# Patient Record
Sex: MALE | URBAN - METROPOLITAN AREA
[De-identification: names, ages, dates, MRNs, and addresses within clinical notes are randomized per-mention and may not be internally consistent; named-entity substitution may affect disease eponyms.]

---

## 2017-01-11 ENCOUNTER — EMERGENCY (EMERGENCY)
Facility: HOSPITAL | Age: 37
LOS: 1 days | Discharge: LEFT BEFORE DISCHARGE | End: 2017-01-11
Attending: EMERGENCY MEDICINE
Payer: SELF-PAY

## 2017-01-11 VITALS
WEIGHT: 160.06 LBS | DIASTOLIC BLOOD PRESSURE: 80 MMHG | OXYGEN SATURATION: 98 % | SYSTOLIC BLOOD PRESSURE: 120 MMHG | RESPIRATION RATE: 18 BRPM | TEMPERATURE: 98 F | HEART RATE: 100 BPM | HEIGHT: 67 IN

## 2017-01-11 DIAGNOSIS — L02.414 CUTANEOUS ABSCESS OF LEFT UPPER LIMB: ICD-10-CM

## 2017-01-11 PROCEDURE — 99281 EMR DPT VST MAYX REQ PHY/QHP: CPT

## 2017-01-11 RX ORDER — IBUPROFEN 200 MG
600 TABLET ORAL ONCE
Qty: 0 | Refills: 0 | Status: DISCONTINUED | OUTPATIENT
Start: 2017-01-11 | End: 2017-01-15

## 2017-01-11 NOTE — ED PROVIDER NOTE - NS ED MD SCRIBE ATTENDING SCRIBE SECTIONS
REVIEW OF SYSTEMS/HISTORY OF PRESENT ILLNESS/PAST MEDICAL/SURGICAL/SOCIAL HISTORY/VITAL SIGNS( Pullset)/DISPOSITION/PHYSICAL EXAM

## 2017-05-19 ENCOUNTER — EMERGENCY (EMERGENCY)
Facility: HOSPITAL | Age: 37
LOS: 1 days | Discharge: HOME | End: 2017-05-19
Attending: EMERGENCY MEDICINE
Payer: MEDICAID

## 2017-05-19 VITALS
TEMPERATURE: 98 F | HEIGHT: 67 IN | RESPIRATION RATE: 16 BRPM | OXYGEN SATURATION: 99 % | WEIGHT: 154.98 LBS | HEART RATE: 78 BPM | SYSTOLIC BLOOD PRESSURE: 120 MMHG | DIASTOLIC BLOOD PRESSURE: 70 MMHG

## 2017-05-19 PROCEDURE — 12002 RPR S/N/AX/GEN/TRNK2.6-7.5CM: CPT

## 2017-05-19 PROCEDURE — 99284 EMERGENCY DEPT VISIT MOD MDM: CPT | Mod: 25

## 2017-05-19 PROCEDURE — 99283 EMERGENCY DEPT VISIT LOW MDM: CPT | Mod: 25

## 2017-05-19 PROCEDURE — 90715 TDAP VACCINE 7 YRS/> IM: CPT

## 2017-05-19 PROCEDURE — 90471 IMMUNIZATION ADMIN: CPT

## 2017-05-19 RX ORDER — TETANUS TOXOID, REDUCED DIPHTHERIA TOXOID AND ACELLULAR PERTUSSIS VACCINE, ADSORBED 5; 2.5; 8; 8; 2.5 [IU]/.5ML; [IU]/.5ML; UG/.5ML; UG/.5ML; UG/.5ML
0.5 SUSPENSION INTRAMUSCULAR ONCE
Qty: 0 | Refills: 0 | Status: COMPLETED | OUTPATIENT
Start: 2017-05-19 | End: 2017-05-19

## 2017-05-19 RX ORDER — AZTREONAM 2 G
1 VIAL (EA) INJECTION
Qty: 10 | Refills: 0 | OUTPATIENT
Start: 2017-05-19 | End: 2017-05-24

## 2017-05-19 RX ADMIN — TETANUS TOXOID, REDUCED DIPHTHERIA TOXOID AND ACELLULAR PERTUSSIS VACCINE, ADSORBED 0.5 MILLILITER(S): 5; 2.5; 8; 8; 2.5 SUSPENSION INTRAMUSCULAR at 19:38

## 2017-05-19 NOTE — ED PROVIDER NOTE - NS ED MD SCRIBE ATTENDING SCRIBE SECTIONS
PHYSICAL EXAM/PAST MEDICAL/SURGICAL/SOCIAL HISTORY/REVIEW OF SYSTEMS/DISPOSITION/VITAL SIGNS( Pullset)/HIV/HISTORY OF PRESENT ILLNESS

## 2017-05-19 NOTE — ED ADULT NURSE NOTE - OBJECTIVE STATEMENT
Patient complains of pain to left hand thumb after cutting it with utility knife. Active bleeding to left thumb, wrapped in gauze at this time. + left radial pulse, able to move all fingers of left hand. Patient states numbness to left thumb. MD Rivera at bedside to suture left thumb.

## 2017-05-19 NOTE — ED PROVIDER NOTE - OBJECTIVE STATEMENT
Co-worker as . 37 y/o M pt with no PMHx and no PSHx presents to ED c/o L thumb laceration with associated pain and active bleeding s/p cutting thumb with a utility knife at work today. Pt denies fever, chills, abd pain, CP, difficulty breathing, or any other complaints. NKDA.

## 2017-05-23 DIAGNOSIS — S61.012A LACERATION WITHOUT FOREIGN BODY OF LEFT THUMB WITHOUT DAMAGE TO NAIL, INITIAL ENCOUNTER: ICD-10-CM

## 2017-05-23 DIAGNOSIS — Z23 ENCOUNTER FOR IMMUNIZATION: ICD-10-CM
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
## 2017-05-30 ENCOUNTER — EMERGENCY (EMERGENCY)
Facility: HOSPITAL | Age: 37
LOS: 1 days | Discharge: HOME | End: 2017-05-30
Attending: EMERGENCY MEDICINE
Payer: MEDICAID

## 2017-05-30 VITALS
WEIGHT: 154.98 LBS | HEART RATE: 80 BPM | SYSTOLIC BLOOD PRESSURE: 117 MMHG | TEMPERATURE: 99 F | RESPIRATION RATE: 18 BRPM | HEIGHT: 67 IN | DIASTOLIC BLOOD PRESSURE: 71 MMHG | OXYGEN SATURATION: 99 %

## 2017-05-30 DIAGNOSIS — L03.012 CELLULITIS OF LEFT FINGER: ICD-10-CM

## 2017-05-30 DIAGNOSIS — Z48.02 ENCOUNTER FOR REMOVAL OF SUTURES: ICD-10-CM

## 2017-05-30 PROCEDURE — 29130 APPL FINGER SPLINT STATIC: CPT | Mod: FA

## 2017-05-30 PROCEDURE — 99284 EMERGENCY DEPT VISIT MOD MDM: CPT | Mod: 25

## 2017-05-30 PROCEDURE — 29130 APPL FINGER SPLINT STATIC: CPT

## 2017-05-30 PROCEDURE — 99282 EMERGENCY DEPT VISIT SF MDM: CPT | Mod: 25

## 2017-05-30 NOTE — ED PROVIDER NOTE - PROGRESS NOTE DETAILS
Patient says that one suture came out, I removed one suture, so six remain, looks like wound infection and sutures are not ready to be removed yet. I placed bacitracin, sterile dressing and splint. Will give abx and have return in 4 days for suture removal. Precautions reviewed.

## 2017-05-30 NOTE — ED PROVIDER NOTE - SKIN, MLM
Skin normal color for race, warm, dry and intact. No evidence of rash. there is a healing wound over the left proximal thumb, 7 sutures are present and there is a 5mm area of surrounding erythema, no fluctuance, no drainage, no fb palptated, normal ROM of the thumb and sensation intact.

## 2017-05-30 NOTE — ED PROCEDURE NOTE - NS ED PERI VASCULAR NEG
no paresthesia/no cyanosis of extremity/no swelling/capillary refill time < 2 seconds/fingers/toes warm to touch

## 2017-05-30 NOTE — ED ADULT NURSE NOTE - OBJECTIVE STATEMENT
AOX3 +ambulatory patient reports here for suture removal. Patient noted with redness around the area no fevers or chills

## 2017-05-30 NOTE — ED PROVIDER NOTE - MEDICAL DECISION MAKING DETAILS
Sutures not ready to be removed, I removed one. Splinted, sterile dressing and topical abx. Will return in 4 days for removal.

## 2017-06-05 ENCOUNTER — EMERGENCY (EMERGENCY)
Facility: HOSPITAL | Age: 37
LOS: 1 days | Discharge: HOME | End: 2017-06-05
Attending: EMERGENCY MEDICINE
Payer: MEDICAID

## 2017-06-05 VITALS
HEART RATE: 89 BPM | OXYGEN SATURATION: 99 % | DIASTOLIC BLOOD PRESSURE: 69 MMHG | TEMPERATURE: 99 F | WEIGHT: 154.98 LBS | SYSTOLIC BLOOD PRESSURE: 113 MMHG | RESPIRATION RATE: 18 BRPM | HEIGHT: 67 IN

## 2017-06-05 DIAGNOSIS — Z48.02 ENCOUNTER FOR REMOVAL OF SUTURES: ICD-10-CM

## 2017-06-05 PROCEDURE — 99281 EMR DPT VST MAYX REQ PHY/QHP: CPT

## 2017-06-05 NOTE — ED ADULT NURSE NOTE - OBJECTIVE STATEMENT
STATES HE CAME IN FOR SUTURE REMOVAL FROM LEFT  THUMB.LEFT THUMB SUTURES INTACT , NO REDNESS , SWELLING OR DISCHARGE NOTED. STATES HE CAME IN FOR SUTURE REMOVAL FROM LEFT  THUMB.LEFT THUMB SUTURES INTACT , NO REDNESS , SWELLING OR DISCHARGE NOTED.SUTRES REMOVED BY DR. ADAN.

## 2017-06-26 NOTE — ED PROVIDER NOTE - OBJECTIVE STATEMENT
36 y/ M pt with no significant PMHx and no significant PSHx presents to ED for suture removal s/p hand sutures placed x14 days ago. Pt was seen x6 days ago and dr did not remove all sutures secondary to signs of infection. Pt notes that he finished his abx. Wound appears clean. Pt currently denies pain, fever, chills or any other complaints.

## 2017-06-26 NOTE — ED PROVIDER NOTE - MEDICAL DECISION MAKING DETAILS
35 y/o M pt presents to ED for suture removal s/p sutures on L hand x14 days ago. Wound appears clean and healed. Removed sutures and D/C home.

## 2017-09-05 ENCOUNTER — EMERGENCY (EMERGENCY)
Facility: HOSPITAL | Age: 37
LOS: 1 days | Discharge: HOME | End: 2017-09-05
Attending: EMERGENCY MEDICINE
Payer: MEDICAID

## 2017-09-05 VITALS
WEIGHT: 164.91 LBS | HEART RATE: 80 BPM | DIASTOLIC BLOOD PRESSURE: 74 MMHG | SYSTOLIC BLOOD PRESSURE: 124 MMHG | HEIGHT: 66 IN | OXYGEN SATURATION: 100 % | RESPIRATION RATE: 16 BRPM | TEMPERATURE: 98 F

## 2017-09-05 DIAGNOSIS — M25.561 PAIN IN RIGHT KNEE: ICD-10-CM

## 2017-09-05 PROCEDURE — 73562 X-RAY EXAM OF KNEE 3: CPT | Mod: 26,RT

## 2017-09-05 PROCEDURE — 99283 EMERGENCY DEPT VISIT LOW MDM: CPT | Mod: 25

## 2017-09-05 RX ORDER — IBUPROFEN 200 MG
600 TABLET ORAL ONCE
Qty: 0 | Refills: 0 | Status: COMPLETED | OUTPATIENT
Start: 2017-09-05 | End: 2017-09-05

## 2017-09-05 NOTE — ED PROVIDER NOTE - NEURO NEGATIVE STATEMENT, MLM
no loss of consciousness, no gait abnormality, no headache, no sensory deficits, and no weakness. No numbness, no tingling.

## 2017-09-05 NOTE — ED PROVIDER NOTE - MEDICAL DECISION MAKING DETAILS
Pt is neurovascularly intact in knee immobilizer. Rx IBU. Pt given cotnact to f/u with ortho. P able to bear weight and ambulate,  stable for discharge and follow up with medical doctor. Pt educated on care and need for follow up. Discussed anticipatory guidance and return precautions. Questions answered. I had a detailed discussion with the patient and/or guardian regarding the historical points, exam findings, and any diagnostic results supporting the discharge diagnosis.

## 2017-09-05 NOTE — ED PROVIDER NOTE - CONDUCTED A DETAILED DISCUSSION WITH PATIENT AND/OR GUARDIAN REGARDING, MDM
radiology results/need for outpatient follow-up return to ED if symptoms worsen, persist or questions arise/need for outpatient follow-up/radiology results

## 2017-09-05 NOTE — ED PROVIDER NOTE - OBJECTIVE STATEMENT
35 y/o M pt with no PMHx and no PSHx presents to ED c/o R knee pain x1 week. Pt denies numbness, tingling, or any other complaints. Pt also denies recent trauma to R knee. NKDA.

## 2017-09-05 NOTE — ED PROVIDER NOTE - PHYSICAL EXAMINATION
MUSCULOSKELETAL: R knee: Anterior, posterior drawer test and Lachman/Vulgus/Varus test are negative. No bony deformities, +knee flexion and extension intact, cap refill < 2 secs, pedal pulses intact. MUSCULOSKELETAL: R knee: Anterior, posterior drawer test, /Vulgus/Varus test are negative. No bony deformities, +knee flexion and extension intact, cap refill < 2 secs, pedal pulses intact.

## 2017-09-06 PROCEDURE — 73562 X-RAY EXAM OF KNEE 3: CPT

## 2017-09-06 PROCEDURE — 99284 EMERGENCY DEPT VISIT MOD MDM: CPT | Mod: 25

## 2017-09-06 RX ORDER — IBUPROFEN 200 MG
1 TABLET ORAL
Qty: 20 | Refills: 0 | OUTPATIENT
Start: 2017-09-06

## 2017-09-06 RX ADMIN — Medication 600 MILLIGRAM(S): at 00:18

## 2017-09-06 NOTE — ED PROCEDURE NOTE - CPROC ED POST PROC CARE GUIDE1
Elevate the injured extremity as instructed./Keep the cast/splint/dressing clean and dry./Verbal/written post procedure instructions were given to patient/caregiver./Instructed patient/caregiver regarding signs and symptoms of infection./Instructed patient/caregiver to follow-up with primary care physician.

## 2017-10-11 ENCOUNTER — EMERGENCY (EMERGENCY)
Facility: HOSPITAL | Age: 37
LOS: 1 days | Discharge: HOME | End: 2017-10-11
Attending: EMERGENCY MEDICINE
Payer: MEDICAID

## 2017-10-11 VITALS
WEIGHT: 160.06 LBS | HEIGHT: 67 IN | DIASTOLIC BLOOD PRESSURE: 75 MMHG | SYSTOLIC BLOOD PRESSURE: 117 MMHG | HEART RATE: 106 BPM | RESPIRATION RATE: 19 BRPM | OXYGEN SATURATION: 100 % | TEMPERATURE: 98 F

## 2017-10-11 VITALS
HEART RATE: 90 BPM | OXYGEN SATURATION: 100 % | RESPIRATION RATE: 18 BRPM | TEMPERATURE: 97 F | SYSTOLIC BLOOD PRESSURE: 124 MMHG | DIASTOLIC BLOOD PRESSURE: 54 MMHG

## 2017-10-11 DIAGNOSIS — R19.7 DIARRHEA, UNSPECIFIED: ICD-10-CM

## 2017-10-11 PROCEDURE — 99283 EMERGENCY DEPT VISIT LOW MDM: CPT | Mod: 25

## 2017-10-11 PROCEDURE — 99283 EMERGENCY DEPT VISIT LOW MDM: CPT

## 2017-10-11 RX ORDER — SODIUM CHLORIDE 9 MG/ML
1000 INJECTION INTRAMUSCULAR; INTRAVENOUS; SUBCUTANEOUS ONCE
Qty: 0 | Refills: 0 | Status: DISCONTINUED | OUTPATIENT
Start: 2017-10-11 | End: 2017-10-15

## 2017-10-11 RX ORDER — KETOROLAC TROMETHAMINE 30 MG/ML
15 SYRINGE (ML) INJECTION ONCE
Qty: 0 | Refills: 0 | Status: COMPLETED | OUTPATIENT
Start: 2017-10-11 | End: 2017-10-11

## 2017-10-11 NOTE — ED PROVIDER NOTE - OBJECTIVE STATEMENT
35 y/o M pt with no significant PMHx and no significant PSHx presents to the ED c/o fever x yesterday and diarrhea x today. Pt states he has mild abd pain and also reports body aches. Pt denies hematuria, melena, cough, vomiting or any other complaints. NKDA.

## 2017-10-11 NOTE — ED PROVIDER NOTE - MEDICAL DECISION MAKING DETAILS
37 y/o M pt presents with diarrhea, abd pain and fever. Will check labs, give analgesia, IV fluids and reassess. 35 y/o M pt presents with diarrhea, abd pain. Declined w/u. Feels improved in ED. Return to the ED immediately if getting worse, not improving, or if having any new or troubling symptoms. f/u with PMD in 1-2 days.

## 2017-10-11 NOTE — ED PROVIDER NOTE - PROGRESS NOTE DETAILS
Nurse called patient multiple times to start his work-up. Patient just came up to nurse's desk and said he was in the bathroom each time his name was called. No longer wants work-up. Feels improved.

## 2019-12-05 NOTE — ED PROVIDER NOTE - NS ED ATTENDING STATEMENT MOD
-- DO NOT REPLY / DO NOT REPLY ALL --  -- Message is from the [facility]--    Patient is requesting a medication refill - medication is on active medication list    Patient is currently OUT of the requested medication.    Was Medication Pended?  Yes.    Rx Name and Dose:tadalafil (CIALIS) 10 MG tablet      Duration: 30 days    Pharmacy  [id number] - [address]    Patient confirmed the above pharmacy as correct?  Yes    Caller Information       Type Contact Phone    12/05/2019 02:20 PM Phone (Incoming) Jose Stallings (Self) [phone] (M)          Alternative phone number: NA    Turnaround time given to caller:   \"This message will be sent to [state Provider's name]. The clinical team will fulfill your request as soon as they review your message.\"  
I personally performed the services described in the documentation, reviewed the documentation recorded by the scribe in my presence and it accurately and completely records my words and action.

## 2020-02-26 ENCOUNTER — EMERGENCY (EMERGENCY)
Facility: HOSPITAL | Age: 40
LOS: 1 days | Discharge: HOME | End: 2020-02-26
Attending: EMERGENCY MEDICINE
Payer: MEDICAID

## 2020-02-26 VITALS
WEIGHT: 171.96 LBS | DIASTOLIC BLOOD PRESSURE: 84 MMHG | SYSTOLIC BLOOD PRESSURE: 138 MMHG | HEIGHT: 67 IN | HEART RATE: 85 BPM | RESPIRATION RATE: 16 BRPM | TEMPERATURE: 98 F | OXYGEN SATURATION: 97 %

## 2020-02-26 LAB
ANION GAP SERPL CALC-SCNC: 6 MMOL/L — SIGNIFICANT CHANGE UP (ref 5–17)
BASOPHILS # BLD AUTO: 0.06 K/UL — SIGNIFICANT CHANGE UP (ref 0–0.2)
BASOPHILS NFR BLD AUTO: 0.6 % — SIGNIFICANT CHANGE UP (ref 0–2)
BUN SERPL-MCNC: 19 MG/DL — HIGH (ref 7–18)
CALCIUM SERPL-MCNC: 8.7 MG/DL — SIGNIFICANT CHANGE UP (ref 8.4–10.5)
CHLORIDE SERPL-SCNC: 105 MMOL/L — SIGNIFICANT CHANGE UP (ref 96–108)
CO2 SERPL-SCNC: 28 MMOL/L — SIGNIFICANT CHANGE UP (ref 22–31)
CREAT SERPL-MCNC: 1.1 MG/DL — SIGNIFICANT CHANGE UP (ref 0.5–1.3)
EOSINOPHIL # BLD AUTO: 0.13 K/UL — SIGNIFICANT CHANGE UP (ref 0–0.5)
EOSINOPHIL NFR BLD AUTO: 1.3 % — SIGNIFICANT CHANGE UP (ref 0–6)
GLUCOSE SERPL-MCNC: 101 MG/DL — HIGH (ref 70–99)
HCT VFR BLD CALC: 40.2 % — SIGNIFICANT CHANGE UP (ref 39–50)
HGB BLD-MCNC: 13.9 G/DL — SIGNIFICANT CHANGE UP (ref 13–17)
IMM GRANULOCYTES NFR BLD AUTO: 0.3 % — SIGNIFICANT CHANGE UP (ref 0–1.5)
LYMPHOCYTES # BLD AUTO: 2.11 K/UL — SIGNIFICANT CHANGE UP (ref 1–3.3)
LYMPHOCYTES # BLD AUTO: 20.4 % — SIGNIFICANT CHANGE UP (ref 13–44)
MCHC RBC-ENTMCNC: 30.8 PG — SIGNIFICANT CHANGE UP (ref 27–34)
MCHC RBC-ENTMCNC: 34.6 GM/DL — SIGNIFICANT CHANGE UP (ref 32–36)
MCV RBC AUTO: 88.9 FL — SIGNIFICANT CHANGE UP (ref 80–100)
MONOCYTES # BLD AUTO: 0.68 K/UL — SIGNIFICANT CHANGE UP (ref 0–0.9)
MONOCYTES NFR BLD AUTO: 6.6 % — SIGNIFICANT CHANGE UP (ref 2–14)
NEUTROPHILS # BLD AUTO: 7.32 K/UL — SIGNIFICANT CHANGE UP (ref 1.8–7.4)
NEUTROPHILS NFR BLD AUTO: 70.8 % — SIGNIFICANT CHANGE UP (ref 43–77)
NRBC # BLD: 0 /100 WBCS — SIGNIFICANT CHANGE UP (ref 0–0)
PLATELET # BLD AUTO: 208 K/UL — SIGNIFICANT CHANGE UP (ref 150–400)
POTASSIUM SERPL-MCNC: 3.6 MMOL/L — SIGNIFICANT CHANGE UP (ref 3.5–5.3)
POTASSIUM SERPL-SCNC: 3.6 MMOL/L — SIGNIFICANT CHANGE UP (ref 3.5–5.3)
RBC # BLD: 4.52 M/UL — SIGNIFICANT CHANGE UP (ref 4.2–5.8)
RBC # FLD: 12.4 % — SIGNIFICANT CHANGE UP (ref 10.3–14.5)
SODIUM SERPL-SCNC: 139 MMOL/L — SIGNIFICANT CHANGE UP (ref 135–145)
WBC # BLD: 10.33 K/UL — SIGNIFICANT CHANGE UP (ref 3.8–10.5)
WBC # FLD AUTO: 10.33 K/UL — SIGNIFICANT CHANGE UP (ref 3.8–10.5)

## 2020-02-26 PROCEDURE — 73620 X-RAY EXAM OF FOOT: CPT | Mod: 26,RT

## 2020-02-26 PROCEDURE — 73610 X-RAY EXAM OF ANKLE: CPT

## 2020-02-26 PROCEDURE — 70450 CT HEAD/BRAIN W/O DYE: CPT

## 2020-02-26 PROCEDURE — 99284 EMERGENCY DEPT VISIT MOD MDM: CPT

## 2020-02-26 PROCEDURE — 70450 CT HEAD/BRAIN W/O DYE: CPT | Mod: 26

## 2020-02-26 PROCEDURE — 80048 BASIC METABOLIC PNL TOTAL CA: CPT

## 2020-02-26 PROCEDURE — 73610 X-RAY EXAM OF ANKLE: CPT | Mod: 26,RT

## 2020-02-26 PROCEDURE — 36415 COLL VENOUS BLD VENIPUNCTURE: CPT

## 2020-02-26 PROCEDURE — 73620 X-RAY EXAM OF FOOT: CPT

## 2020-02-26 PROCEDURE — 85027 COMPLETE CBC AUTOMATED: CPT

## 2020-02-26 PROCEDURE — 99284 EMERGENCY DEPT VISIT MOD MDM: CPT | Mod: 25

## 2020-02-26 NOTE — ED PROVIDER NOTE - NS ED MD EM SELECTION
Chemotherapy Verification - PRIMARY RN      Height = 156cm  Weight = 49.6kg  BSA = 1.47m2       Medication: Navelbine  Dose: 25mg/m2  Calculated Dose: 36.75mg                             (In mg/m2, AUC, mg/kg)           I confirm this process was performed independently with the BSA and all final chemotherapy dosing calculations congruent.  Any discrepancies of 5% or greater have been addressed with the chemotherapy pharmacist. The resolution of the discrepancy has been documented in the EPIC progress notes.        [address] Detailed

## 2020-02-26 NOTE — ED PROVIDER NOTE - CLINICAL SUMMARY MEDICAL DECISION MAKING FREE TEXT BOX
Patient with right ankle pain and paresthesia in the foot. Neurovascularly intact. Obtain imaging, labs, and reassess.

## 2020-02-26 NOTE — ED PROVIDER NOTE - PATIENT PORTAL LINK FT
You can access the FollowMyHealth Patient Portal offered by [facility] by registering at the following website: [email or web address]. By joining [employer]’s FollowMyHealth portal, you will also be able to view your health information using other applications (apps) compatible with our system.

## 2020-02-26 NOTE — ED ADULT NURSE NOTE - NSIMPLEMENTINTERV_GEN_ALL_ED
Implemented All Fall Risk Interventions:  [place] to call system. Call bell, personal items and telephone within reach. Instruct patient to call for assistance. Room bathroom lighting operational. Non-slip footwear when patient is off stretcher. Physically safe environment: no spills, clutter or unnecessary equipment. Stretcher in lowest position, wheels locked, appropriate side rails in place. Provide visual cue, wrist band, yellow gown, etc. Monitor gait and stability. Monitor for mental status changes and reorient to person, place, and time. Review medications for side effects contributing to fall risk. Reinforce activity limits and safety measures with patient and family.

## 2020-02-26 NOTE — ED PROVIDER NOTE - OBJECTIVE STATEMENT
40 y/o M patient w/ no significant PMHx and no significant PSHx presents to the ED with right ankle pain. Patient reports he drank alcohol heavily x8 days ago and fell and injured his right ankle. Patient says he doesn't recall what happened after the incident. Patient denies drinking alcohol everyday. Patient has pain to the right lateral ankle along with numbness to the foot and leg. Patient denies headache, neck pain, chills, nausea, vomiting, weakness, and any other complaints. NKDA.

## 2020-02-26 NOTE — ED PROVIDER NOTE - NSFOLLOWUPCLINICS_GEN_ALL_ED_FT
[facility]  Neurology  [address] [place] [address]  Phone: [phone]  Fax: [phone]  Follow Up Time:

## 2020-02-26 NOTE — ED PROVIDER NOTE - PHYSICAL EXAMINATION
Decreased sensation worse in the plantar surface of the foot but present in the entire right leg compared to left  DP/PT pulses intact 2+  Normal plantar and dorsi flexion Decreased sensation worse in the plantar surface of the foot but present in the entire right leg compared to left, normal reflexes in the lower ext.  DP/PT pulses intact 2+  Normal plantar and dorsi flexion

## 2020-03-02 ENCOUNTER — EMERGENCY (EMERGENCY)
Facility: HOSPITAL | Age: 40
LOS: 1 days | Discharge: HOME | End: 2020-03-02
Attending: EMERGENCY MEDICINE
Payer: MEDICAID

## 2020-03-02 VITALS
SYSTOLIC BLOOD PRESSURE: 139 MMHG | HEART RATE: 92 BPM | HEIGHT: 67 IN | TEMPERATURE: 98 F | WEIGHT: 171.96 LBS | DIASTOLIC BLOOD PRESSURE: 76 MMHG | RESPIRATION RATE: 20 BRPM | OXYGEN SATURATION: 98 %

## 2020-03-02 PROCEDURE — 99283 EMERGENCY DEPT VISIT LOW MDM: CPT

## 2020-03-02 RX ORDER — DIAZEPAM 5 MG
5 TABLET ORAL ONCE
Refills: 0 | Status: DISCONTINUED | OUTPATIENT
Start: 2020-03-02 | End: 2020-03-02

## 2020-03-02 RX ADMIN — Medication 0.1 MILLIGRAM(S): at 19:55

## 2020-03-02 RX ADMIN — Medication 5 MILLIGRAM(S): at 19:56

## 2020-03-02 NOTE — ED PROVIDER NOTE - PATIENT PORTAL LINK FT
You can access the FollowMyHealth Patient Portal offered by [facility] by registering at the following website: [email or web address]. By joining [employer]’s FollowMyHealth portal, you will also be able to view your health information using other applications (apps) compatible with our system.

## 2020-03-02 NOTE — ED PROVIDER NOTE - PHYSICAL EXAMINATION
Looks anxious  foot and ankle normal  normal pulses, no deformity  no swelling, Normal ROM  skin looks normal

## 2020-03-02 NOTE — ED PROVIDER NOTE - OBJECTIVE STATEMENT
39 y.o male with no PMHx and no PSHx presents to the ED with anxiety due to heroine withdrawal and paresthesias of the foot. Patient has not used heroine since yesterday and woke up anxious today. Patient would like to stop using and would like to go to rehab and talk to rehab program. Patient also had paresthesias of the feet, s/p fall x2 weeks ago while intoxicated, patient does not remember what happened, and has had ankle pain and paresthesias ever since. Patient was seen here on February 26th 2020 and had xray's done which were normal, ct head which was also normal. Patient was referred to neurologist , but has not made an appointment. Patient denies any other acute complaint. NKDA

## 2020-03-02 NOTE — ED PROVIDER NOTE - CLINICAL SUMMARY MEDICAL DECISION MAKING FREE TEXT BOX
Will give patient clonidine and diazepam for anxiety and withdrawal. Will give resources, ace bandage and help find doctor more quickly

## 2020-03-02 NOTE — ED ADULT NURSE NOTE - OBJECTIVE STATEMENT
pt is here for anxiety.  pt stated that right foot pain, denied chest pain or sob, no distress noted at this time.

## 2020-03-09 ENCOUNTER — APPOINTMENT (OUTPATIENT)
Dept: NEUROLOGY | Facility: CLINIC | Age: 40
End: 2020-03-09

## 2020-03-16 ENCOUNTER — EMERGENCY (EMERGENCY)
Facility: HOSPITAL | Age: 40
LOS: 1 days | Discharge: HOME | End: 2020-03-16
Admitting: STUDENT IN AN ORGANIZED HEALTH CARE EDUCATION/TRAINING PROGRAM
Payer: SELF-PAY

## 2020-03-16 ENCOUNTER — OUTPATIENT (OUTPATIENT)
Dept: OUTPATIENT SERVICES | Facility: HOSPITAL | Age: 40
LOS: 1 days | Discharge: STILL ADMITTED OR ADMINISTRATIVE | End: 2020-03-16

## 2020-03-16 VITALS
RESPIRATION RATE: 18 BRPM | TEMPERATURE: 98 F | OXYGEN SATURATION: 98 % | HEART RATE: 88 BPM | DIASTOLIC BLOOD PRESSURE: 82 MMHG | SYSTOLIC BLOOD PRESSURE: 125 MMHG

## 2020-03-16 VITALS
DIASTOLIC BLOOD PRESSURE: 78 MMHG | SYSTOLIC BLOOD PRESSURE: 127 MMHG | RESPIRATION RATE: 18 BRPM | TEMPERATURE: 98 F | OXYGEN SATURATION: 99 % | HEART RATE: 80 BPM

## 2020-03-16 PROCEDURE — 99283 EMERGENCY DEPT VISIT LOW MDM: CPT | Mod: 25

## 2020-03-16 PROCEDURE — 93010 ELECTROCARDIOGRAM REPORT: CPT

## 2020-03-16 NOTE — ED PROVIDER NOTE - OBJECTIVE STATEMENT
This is a 39 yr old M, pmh substance abuse with c/o  lethargy and overdose , substance intoxication. Provider was called to amb to evaluate pt who is in amb for the last 4 hrs. Pt calm cooperative, no physical nor medical distress. Pt unable to recall how  he got into  the ed, he states he woke up in here.  Pt states he had a panic attack and wanted medication for it. He states he can not work for the last month.

## 2020-03-16 NOTE — ED PROVIDER NOTE - NS ED ROS FT
+ durg intoxication, ROS  	•	CONSTITUTIONAL - no fever, no diaphoresis, no weight change  	•	SKIN - no rash  	•	HEMATOLOGIC - no bleeding, no bruising  	•	EYES - no eye pain, no blurred vision  	•	ENT - no change in hearing, no pain  	•	RESPIRATORY - no shortness of breath, no cough  	•	CARDIAC - no chest pain, no palpitations  	•	GI - no abd pain, no nausea, no vomiting, no diarrhea, no constipation, no bleeding  	•	GENITO-URINARY - no frequent urination, no urgency, no dysuria; no hematuria,    	•	ENDO - no polydypsia, no polyurea, no heat/no cold intolerance  	•	MUSCULOSKELETAL - no joint paint, no swelling, no redness  	•	NEUROLOGIC - no weakness, no headache, no anesthesia, no paresthesias  	•	PSYCH - no suicidal, no homicidal, no hallucination,

## 2020-03-16 NOTE — ED PROVIDER NOTE - PATIENT PORTAL LINK FT
You can access the FollowMyHealth Patient Portal offered by [facility] by registering at the following website: [email or web address]. By joining [employer]’s FollowMyHealth portal, you will also be able to view your health information using other applications (apps) compatible with our system.

## 2020-03-16 NOTE — ED PROVIDER NOTE - CLINICAL SUMMARY MEDICAL DECISION MAKING FREE TEXT BOX
This is a 39 yr old M, [facility] substance abuse with c/o  lethargy and overdose , substance intoxication. Collateral info obtained from sister Yanna Molina , who states pt using drugs, heroin, synthetic heroin, fentanyl, xanax, oxycontin. Today pt became with erratic behaviour, in and out of lucidness. He took him to the hospital to be admitted to a rehab facility.   Blood sugar 94, vss, ciwa 0, no ams, sob, chills, fever, no chest pain, no abd pain no n/v/ no diaphoresis, no acute withdraws, oob walks with steady gait. Pt explicitly Denies SI/HI/AH/VH. Denies falling, punching or kicking any objects. No evidence of physical injuries.  There is no clinical evidence of intoxication, or any acute medical problem requiring immediate intervention.  Rehab referrals and resources provided for pt to follow up out patient. Sister will come and pick him up.

## 2020-03-16 NOTE — ED ADULT TRIAGE NOTE - CHIEF COMPLAINT QUOTE
p/t sent from [facility] for eval, p/t with hx heroin abuse walked in to crisis center lethargic, denies any discomfort, responsive to voice p/t sent from [place] [facility] for eval, p/t with hx heroin abuse walked in to crisis center lethargic, denies any discomfort, responsive to voice, fs 87mdl p/t sent from [place] [facility] for eval, p/t with hx heroin abuse walked in to crisis center lethargic, denies any discomfort, responsive to voice, fs 87mdl.      addendum- belongings in security and across from rm 21.

## 2020-03-16 NOTE — ED CLERICAL - NS ED CLERK NOTE PRE-ARRIVAL INFORMATION; ADDITIONAL PRE-ARRIVAL INFORMATION
Pt being sent in for change in mental status had head CT which was negative.  Pt with PMH of drug abuse Heroin.

## 2020-03-17 DIAGNOSIS — R41.0 DISORIENTATION, UNSPECIFIED: ICD-10-CM

## 2020-03-17 DIAGNOSIS — F11.20 OPIOID DEPENDENCE, UNCOMPLICATED: ICD-10-CM

## 2020-03-17 NOTE — ED ADULT NURSE NOTE - CHIEF COMPLAINT QUOTE
p/t sent from [facility] for eval, p/t with hx heroin abuse walked in to crisis center lethargic, denies any discomfort, responsive to voice, fs 87mdl.      addendum- belongings in security and across from rm 21.

## 2020-04-01 ENCOUNTER — OUTPATIENT (OUTPATIENT)
Dept: OUTPATIENT SERVICES | Facility: HOSPITAL | Age: 40
LOS: 1 days | End: 2020-04-01
Payer: MEDICAID

## 2020-04-01 PROCEDURE — G9001: CPT

## 2020-04-02 DIAGNOSIS — Z71.89 OTHER SPECIFIED COUNSELING: ICD-10-CM

## 2020-05-11 ENCOUNTER — APPOINTMENT (OUTPATIENT)
Dept: NEUROLOGY | Facility: CLINIC | Age: 40
End: 2020-05-11

## 2020-06-06 NOTE — ED PROVIDER NOTE - MUSCULOSKELETAL MINIMAL EXAM
neck supple/atraumatic/motor intact discussed with patient her low cell counts, needs heme f/u she and sister both understand. Pt was signed out to me pending Ortho consult; Dr. Peguero has still been following pt.  In the interim, Ortho evaluated pt; states pt may be discharged home from Ortho perspective; states should f/u in one week with Ortho Dr. Gaines and take NSAIDS PRN for pain/discomfort as needed.  C/D with Dr. Peguero; pt will be discharged home per below instructions.  Pt given crutch instructions and pt demonstrated proper use prior to discharge.

## 2021-01-07 NOTE — ED ADULT TRIAGE NOTE - WEIGHT IN LBS
----- Message from Britt Prajapati sent at 1/7/2021  1:50 PM EST -----  Regarding: Andrew Mota  Contact: [phone]  Patient's benefits have changed, so the patient's medication Fensolvi (6 months) needs to be filled at [address] [place] [address]. The patient needs a refill. Please advise Pharmacy at [phone]. [phone]

## 2021-03-26 NOTE — ED PROVIDER NOTE - GASTROINTESTINAL, MLM
Physical Therapy  Visit Type: treatment  Precautions:  Medical precautions:  fall risk;. PT wore gloves, procedure mask and goggles while with pt.    Lines:       Lines in chart and on patient reviewed, cautions maintained throughout session.    SUBJECTIVE  Patient agreed to participate in therapy this date.  \"I am in so much pain\"    Pain     Location: B LE, abdomen     OBJECTIVE      Bed Mobility:        Supine to long sit: minimal assist    Supine to sit: minimal assist    Sit to supine: minimal assist  Training completed:      Education details: body mechanics and patient safety  Transfers:    Assistive devices: 2-wheeled walker    Sit to stand: minimal assist and contact guard/touching/steadying assist  Trial 2: minimal assist and contact guard/touching/steadying assist  Training completed:    Tasks: sit to stand and stand to sit    Education details: body mechanics, patient safety and patient requires additional training  Gait/Ambulation:     Assistance: minimal assist   Assistive device: 2-wheeled walker    Distance (ft): 3    Gait pattern: side-steps.  Training Completed:    Tasks: gait training on level surfaces    Education details: patient safety and patient requires additional training      Interventions     Training provided: gait training, safety training, transfer training, activity tolerance and bed mobility training    Skilled input: Verbal instruction/cues       ASSESSMENT    PT treatment completed after coordinating care with BAILEY Greer.  Pt reported significant pain in B LE and abdominal pain. Pt agreed to sit at edge of bed and exercise; however, once in sitting, she requested standing up to walker. Pt able to complete sit to stand x 2 trials to RW with min/CGA and able to side-step along edge of bed with min A. Pt returned to supine with min A for LE management. Pt reported most pain with movement, subsides at rest. Pt resting comfortably in bed at end of session.     Discharge Recommendations    Recommendation for Discharge: PT IL: Patient needs daily, skilled therapy for 1-3 hours a day by at least two disciplines, Other (comment)(Pt only able to ambulate 5' with RW this session d/t pain. Will continue to follow pt and update d/c recommendation as needed.)        PT/OT Mobility Equipment for Discharge: none. Pt has needed equipment.      PT Identified Barriers to Discharge: pain         Pain at end of session: , location: B LE, abdomen    End of Session:   Location: in bed  Safety measures: alarm system in place/re-engaged, bed rails x2 and call light within reach  Handoff to: nurse    PLAN    Suggestions for next session as indicated: PT Frequency: Once a day, 5 days/week, 4 days/week  Frequency Comments: seen 3/26 AG          Documented in the chart in the following areas: Assessment.      Therapy procedure time and total treatment time can be found documented on the Time Entry flowsheet   Abdomen mildly diffusely tender. Normal bowel sounds. No abd distention. No rebound, no guarding.

## 2021-08-05 NOTE — ED PROVIDER NOTE - NS_EDPROVIDERDISPOUSERTYPE_ED_A_ED
[[id number]] Scribe Attestation (For Scribes USE Only)... Attending Attestation (For Attendings USE Only).../Scribe Attestation (For Scribes USE Only)...

## 2021-12-02 NOTE — ED PROVIDER NOTE - PMH
General


Chief Complaint:  Abdominal/GI Problems


Stated Complaint:  STOMACH ULCERS


Source of Information:  Patient


Exam Limitations:  No Limitations





History of Present Illness


Date Seen by Provider:  Dec 2, 2021


Time Seen by Provider:  02:14


Initial Comments


Patient to the ER by EMS from [place] where he was hanging out and complaining 

that he was awoken tonight with a little nausea vomiting and saw some blood in 

it.  He has a history of ulcers and hemorrhoids.  He says he had an EGD couple 

years ago in [place] by  Who is no longer in practice.  He also has had EGD 

and colonoscopy at [place] many years ago.  [facility] 

Silver at [facility].  He says last time he saw him he told him just to 

hang in there.  Patient says he speaks to [facility] over [facility] phone

but they do not have any plans of doing anything other than pantoprazole right 

now.  He denies a history of hepatitis, liver disease, bleeding disorders or 

blood thinners.  No history of esophageal varices.  Patient does not take 

NSAIDs.  He quit drinking a month or 2 ago because he said it just made his 

stomach hurt.  He is not having any pain today just some nausea.  He does not 

have any nausea medicines.  He does admit to using methamphetamines and 

marijuana.  Patient is a relatively difficult historian as he is currently 

constantly in motion and very distractible.





Allergies and Home Medications


Allergies


Coded Allergies:  


     No Known Drug Allergies (Unverified , 12/2/21)





Patient Home Medication List


Home Medication List Reviewed:  Yes





Review of Systems


Review of Systems


Constitutional:  No chills, No diaphoresis


EENTM:  No Blurred Vision, No Double Vision


Respiratory:  Denies Cough, Denies Shortness of Air


Cardiovascular:  Denies Chest Pain, Denies Lightheadedness


Gastrointestinal:  See HPI; Denies Abdominal Pain, Denies Constipated; Nausea, 

Vomiting


Genitourinary:  Denies Burning, Denies Discharge


Musculoskeletal:  No back pain, No joint pain





All Other Systems Reviewed


Negative Unless Noted:  Yes





Past Medical-Social-Family Hx


Patient Social History


Tobacco Use?:  Yes


Use of E-Cig and/or Vaping dev:  No


Substance use?:  Yes


Substance type:  Methamphetamine, Marijuana


Alcohol Use?:  Yes


Alcohol type:  Hard Liquor (Vodka)


Alcohol Frequency:  Once in a while





Physical Exam


Vital Signs





Vital Signs - First Documented








 12/2/21





 02:19


 


Temp 37.0


 


Pulse 79


 


Resp 20


 


B/P (MAP) 158/95 (116)


 


Pulse Ox 95


 


O2 Delivery Room Air





Capillary Refill :


Height/Weight/BMI


Height: '"


Weight: lbs. oz. kg;  BMI


Method:


General Appearance:  WD/WN, mild distress


HEENT:  PERRL/EOMI; No pharynx normal (Dry oral mucosa)


Neck:  full range of motion, supple, normal inspection


Respiratory:  lungs clear, normal breath sounds, no respiratory distress, no 

accessory muscle use


Cardiovascular:  normal peripheral pulses, regular rate, rhythm


Peripheral Pulses:  2+ Radial Pulses (R), 2+ Radial Pulses (L)


Gastrointestinal:  normal bowel sounds, non tender, soft


Extremities:  normal inspection, no pedal edema, normal capillary refill


Neurologic/Psychiatric:  alert, oriented x 3, other (Tweaking, dystonic jerking 

motions.)


Skin:  normal color, warm/dry





Progress/Results/Core Measures


Results/Orders


Lab Results





Laboratory Tests








Test


 12/2/21


03:15 Range/Units


 


 


White Blood Count


 8.3 


 4.3-11.0


10^3/uL


 


Red Blood Count


 4.29 L


 4.30-5.52


10^6/uL


 


Hemoglobin 13.6  13.3-17.7  g/dL


 


Hematocrit 41  40-54  %


 


Mean Corpuscular Volume 95  80-99  fL


 


Mean Corpuscular Hemoglobin 32  25-34  pg


 


Mean Corpuscular Hemoglobin


Concent 33 


 32-36  g/dL





 


Red Cell Distribution Width 12.3  10.0-14.5  %


 


Platelet Count


 230 


 130-400


10^3/uL


 


Mean Platelet Volume 9.7  9.0-12.2  fL


 


Immature Granulocyte % (Auto) 0   %


 


Neutrophils (%) (Auto) 69  42-75  %


 


Lymphocytes (%) (Auto) 17  12-44  %


 


Monocytes (%) (Auto) 9  0-12  %


 


Eosinophils (%) (Auto) 4  0-10  %


 


Basophils (%) (Auto) 1  0-10  %


 


Neutrophils # (Auto)


 5.8 


 1.8-7.8


10^3/uL


 


Lymphocytes # (Auto)


 1.4 


 1.0-4.0


10^3/uL


 


Monocytes # (Auto)


 0.7 


 0.0-1.0


10^3/uL


 


Eosinophils # (Auto)


 0.3 


 0.0-0.3


10^3/uL


 


Basophils # (Auto)


 0.1 


 0.0-0.1


10^3/uL


 


Immature Granulocyte # (Auto)


 0.0 


 0.0-0.1


10^3/uL


 


Sodium Level 138  135-145  MMOL/L


 


Potassium Level 4.0  3.6-5.0  MMOL/L


 


Chloride Level 105    MMOL/L


 


Carbon Dioxide Level 22  21-32  MMOL/L


 


Anion Gap 11  5-14  MMOL/L


 


Blood Urea Nitrogen 19 H 7-18  MG/DL


 


Creatinine


 0.91 


 0.60-1.30


MG/DL


 


Estimat Glomerular Filtration


Rate 84 


  





 


BUN/Creatinine Ratio 21   


 


Glucose Level 101    MG/DL


 


Calcium Level 8.6  8.5-10.1  MG/DL


 


Corrected Calcium 8.7  8.5-10.1  MG/DL


 


Total Bilirubin 0.2  0.1-1.0  MG/DL


 


Aspartate Amino Transf


(AST/SGOT) 11 


 5-34  U/L





 


Alanine Aminotransferase


(ALT/SGPT) 10 


 0-55  U/L





 


Alkaline Phosphatase 80    U/L


 


Total Creatine Kinase 60    U/L


 


Total Protein 7.0  6.4-8.2  GM/DL


 


Albumin 3.9  3.2-4.5  GM/DL








My Orders





Orders - GLENROY CARDENAS


Cbc With Automated Diff (12/2/21 02:31)


Comprehensive Metabolic Panel (12/2/21 02:31)


Creatine Kinase (12/2/21 02:31)


Ed Iv/Invasive Line Start (12/2/21 02:31)


Ns Iv 1000 Ml (Sodium Chloride 0.9%) (12/2/21 02:45)


Ondansetron Injection (Zofran Injectio (12/2/21 02:45)


Pantoprazole Injection (Protonix Injecti (12/2/21 02:45)





Medications Given in ED





Current Medications








 Medications  Dose


 Ordered  Sig/Cheryl


 Route  Start Time


 Stop Time Status Last Admin


Dose Admin


 


 Ondansetron HCl  8 mg  ONCE  ONCE


 IVP  12/2/21 02:45


 12/2/21 02:46 DC 12/2/21 02:57


8 MG


 


 Pantoprazole  40 mg  ONCE  ONCE


 IV  12/2/21 02:45


 12/2/21 02:46 DC 12/2/21 02:56


40 MG








Vital Signs/I&O











 12/2/21





 02:19


 


Temp 37.0


 


Pulse 79


 


Resp 20


 


B/P (MAP) 158/95 (116)


 


Pulse Ox 95


 


O2 Delivery Room Air











Progress


Progress Note #1:  


   Time:  02:37


Progress Note


Patient appears to be quite dehydrated.  Suspect he may have used 

methamphetamines since earlier than the last 2 months like he states.  We will 

attempt to place some IV Zofran and fluids on board.  I encouraged him to 

follow-up with his primary care provider to get plugged in with a 

gastroenterologist.  We will rule out significant anemia.  Protonix 40 mg IV


Progress Note #2:  


   Time:  04:03


Progress Note


The patient is gotten his IV fluids.  He is not in rhabdo.  His labs look okay. 

He has urinated twice since has been here.  Passing bowels with no gross blood 

in them.  No nausea or vomiting since he arrived.  We will provide him with a 

prescription for some Zofran and to follow-up with his primary care provider in 

[facility] to pursue appropriate work-up.





Departure


Impression





   Primary Impression:  


   Hematemesis


   Qualified Codes:  K92.0 - Hematemesis


Disposition:  01 HOME, SELF-CARE


Condition:  Stable





Departure-Patient Inst.


Decision time for Depature:  04:04


Referrals:  


UNKNOWN (PCP)


Primary Care Physician


Patient Instructions:  Gastrointestinal Bleeding (DC)





Add. Discharge Instructions:  


Continue taking your medications as prescribed.


Carafate 1 tablet half an hour before meals and at bedtime.


Zofran 1 tablet under the tongue every 6 hours as necessary for nausea and/or 

vomiting.


Follow-up with your primary care physician to discuss a referral to 

gastroenterology.





All discharge instructions reviewed with patient and/or family. Voiced 

understanding.


Scripts


Ondansetron (Ondansetron Odt) 4 Mg Tab.rapdis


4 MG PO Q6H PRN for NAUSEA/VOMITING, #8 TAB 0 Refills


   Prov: GLENROY CARDENAS         12/2/21 


Sucralfate (Carafate) 1 Gm Tablet


1 GM PO QIDACHS for 14 Days, #56 TAB 0 Refills


   Prov: GLENROY CARDENAS         12/2/21











GLENROY CARDENAS                  Dec 2, 2021 02:38
No pertinent past medical history

## 2022-03-10 NOTE — ED PROVIDER NOTE - OBJECTIVE STATEMENT
Yes... 36 year old male came to the ED to have his sutures removed. They were placed approximately 10 days ago. No fever, no chills, but he has noted some pain to the laceration site and has an appointment with the hand surgeon Dr Leal.

## 2025-01-23 NOTE — ED ADULT NURSE NOTE - NS ED NOTE ABUSE SUSPICION NEGLECT YN
Problem: PAIN - ADULT  Goal: Verbalizes/displays adequate comfort level or baseline comfort level  Description: Interventions:  - Encourage patient to monitor pain and request assistance  - Assess pain using appropriate pain scale  - Administer analgesics based on type and severity of pain and evaluate response  - Implement non-pharmacological measures as appropriate and evaluate response  - Consider cultural and social influences on pain and pain management  - Notify physician/advanced practitioner if interventions unsuccessful or patient reports new pain  Outcome: Progressing     Problem: INFECTION - ADULT  Goal: Absence or prevention of progression during hospitalization  Description: INTERVENTIONS:  - Assess and monitor for signs and symptoms of infection  - Monitor lab/diagnostic results  - Monitor all insertion sites, i.e. indwelling lines, tubes, and drains  - Monitor endotracheal if appropriate and nasal secretions for changes in amount and color  - [place] appropriate cooling/warming therapies per order  - Administer medications as ordered  - Instruct and encourage patient and family to use good hand hygiene technique  - Identify and instruct in appropriate isolation precautions for identified infection/condition  Outcome: Progressing  Goal: Absence of fever/infection during neutropenic period  Description: INTERVENTIONS:  - Monitor WBC    Outcome: Progressing     Problem: SAFETY ADULT  Goal: Patient will remain free of falls  Description: INTERVENTIONS:  - Educate patient/family on patient safety including physical limitations  - Instruct patient to call for assistance with activity   - Consult OT/PT to assist with strengthening/mobility   - Keep Call bell within reach  - Keep bed low and locked with side rails adjusted as appropriate  - Keep care items and personal belongings within reach  - Initiate and maintain comfort rounds  - Make Fall Risk Sign visible to staff  - Offer Toileting every 1 Hours, 
in advance of need  - Initiate/Maintain bed/chair alarm  - Obtain necessary fall risk management equipment  - Apply yellow socks and bracelet for high fall risk patients  - Consider moving patient to room near nurses station  Outcome: Progressing  Goal: Maintain or return to baseline ADL function  Description: INTERVENTIONS:  -  Assess patient's ability to carry out ADLs; assess patient's baseline for ADL function and identify physical deficits which impact ability to perform ADLs (bathing, care of mouth/teeth, toileting, grooming, dressing, etc.)  - Assess/evaluate cause of self-care deficits   - Assess range of motion  - Assess patient's mobility; develop plan if impaired  - Assess patient's need for assistive devices and provide as appropriate  - Encourage maximum independence but intervene and supervise when necessary  - Involve family in performance of ADLs  - Assess for home care needs following discharge   - Consider OT consult to assist with ADL evaluation and planning for discharge  - Provide patient education as appropriate  Outcome: Progressing  Goal: Maintains/Returns to pre admission functional level  Description: INTERVENTIONS:  - Perform AM-PAC 6 Click Basic Mobility/ Daily Activity assessment daily.  - Set and communicate daily mobility goal 3 to care team and patient/family/caregiver.   - Collaborate with rehabilitation services on mobility goals if consulted  - Perform Range of Motion 2 times a day.  - Reposition patient every 3 hours.  - Dangle patient 3 times a day  - Stand patient 3 times a day  - Ambulate patient 3 times a day  - Out of bed to chair 3 times a day   - Out of bed for meals 3 times a day  - Out of bed for toileting  - Record patient progress and toleration of activity level   Outcome: Progressing     Problem: DISCHARGE PLANNING  Goal: Discharge to home or other facility with appropriate resources  Description: INTERVENTIONS:  - Identify barriers to discharge w/patient and 
caregiver  - Arrange for needed discharge resources and transportation as appropriate  - Identify discharge learning needs (meds, wound care, etc.)  - Arrange for interpretive services to assist at discharge as needed  - Refer to Case Management Department for coordinating discharge planning if the patient needs post-hospital services based on physician/advanced practitioner order or complex needs related to functional status, cognitive ability, or social support system  Outcome: Progressing     Problem: Knowledge Deficit  Goal: Patient/family/caregiver demonstrates understanding of disease process, treatment plan, medications, and discharge instructions  Description: Complete learning assessment and assess knowledge base.  Interventions:  - Provide teaching at level of understanding  - Provide teaching via preferred learning methods  Outcome: Progressing     Problem: RESPIRATORY - ADULT  Goal: Achieves optimal ventilation and oxygenation  Description: INTERVENTIONS:  - Assess for changes in respiratory status  - Assess for changes in mentation and behavior  - Position to facilitate oxygenation and minimize respiratory effort  - Oxygen administered by appropriate delivery if ordered  - Initiate smoking cessation education as indicated  - Encourage broncho-pulmonary hygiene including cough, deep breathe, Incentive Spirometry  - Assess the need for suctioning and aspirate as needed  - Assess and instruct to report SOB or any respiratory difficulty  - Respiratory Therapy support as indicated  Outcome: Progressing     Problem: Nutrition/Hydration-ADULT  Goal: Nutrient/Hydration intake appropriate for improving, restoring or maintaining nutritional needs  Description: Monitor and assess patient's nutrition/hydration status for malnutrition. Collaborate with interdisciplinary team and initiate plan and interventions as ordered.  Monitor patient's weight and dietary intake as ordered or per policy. Utilize nutrition 
screening tool and intervene as necessary. Determine patient's food preferences and provide high-protein, high-caloric foods as appropriate.     INTERVENTIONS:  - Monitor oral intake, urinary output, labs, and treatment plans  - Assess nutrition and hydration status and recommend course of action  - Evaluate amount of meals eaten  - Assist patient with eating if necessary   - Allow adequate time for meals  - Recommend/ encourage appropriate diets, oral nutritional supplements, and vitamin/mineral supplements  - Order, calculate, and assess calorie counts as needed  - Recommend, monitor, and adjust tube feedings and TPN/PPN based on assessed needs  - Assess need for intravenous fluids  - Provide specific nutrition/hydration education as appropriate  - Include patient/family/caregiver in decisions related to nutrition  Outcome: Progressing     
Universal Safety Interventions
No

## 2025-04-21 NOTE — ED PROVIDER NOTE - NS ED MD DISPO DISCHARGE CCDA
Department of Anesthesiology  Postprocedure Note    Patient: Blaine García  MRN: [id number]  YOB: 1944  Date of evaluation: 4/21/2025    Procedure Summary       Date: 04/21/25 Room / Location: [place] [address] / [facility]    Ann Marie Mtz MD Responsible Provider: Alex Rudolph MD    Anesthesia Type: MAC ASA Status: 3            Anesthesia Type: No value filed.    Erik Phase I: Erik Score: 10    Erik Phase II: Erik 
Patient/Caregiver provided printed discharge information.